# Patient Record
Sex: FEMALE | Race: WHITE | Employment: OTHER | URBAN - NONMETROPOLITAN AREA
[De-identification: names, ages, dates, MRNs, and addresses within clinical notes are randomized per-mention and may not be internally consistent; named-entity substitution may affect disease eponyms.]

---

## 2019-09-22 ENCOUNTER — HOSPITAL ENCOUNTER (EMERGENCY)
Age: 84
Discharge: HOME | End: 2019-09-22
Attending: EMERGENCY MEDICINE
Payer: MEDICARE

## 2019-09-22 ENCOUNTER — APPOINTMENT (OUTPATIENT)
Dept: GENERAL RADIOLOGY | Age: 84
End: 2019-09-22
Payer: MEDICARE

## 2019-09-22 VITALS
OXYGEN SATURATION: 96 % | HEIGHT: 63 IN | BODY MASS INDEX: 19.49 KG/M2 | HEART RATE: 72 BPM | RESPIRATION RATE: 14 BRPM | WEIGHT: 110 LBS | TEMPERATURE: 98.5 F | DIASTOLIC BLOOD PRESSURE: 70 MMHG | SYSTOLIC BLOOD PRESSURE: 153 MMHG

## 2019-09-22 DIAGNOSIS — S82.002A CLOSED DISPLACED FRACTURE OF LEFT PATELLA, UNSPECIFIED FRACTURE MORPHOLOGY, INITIAL ENCOUNTER: Primary | ICD-10-CM

## 2019-09-22 PROCEDURE — L1830 KO IMMOB CANVAS LONG PRE OTS: HCPCS

## 2019-09-22 PROCEDURE — 73564 X-RAY EXAM KNEE 4 OR MORE: CPT

## 2019-09-22 PROCEDURE — 2709999900 HC NON-CHARGEABLE SUPPLY

## 2019-09-22 PROCEDURE — 99284 EMERGENCY DEPT VISIT MOD MDM: CPT

## 2019-09-22 RX ORDER — HYDROCODONE BITARTRATE AND ACETAMINOPHEN 5; 325 MG/1; MG/1
1 TABLET ORAL EVERY 6 HOURS PRN
Qty: 8 TABLET | Refills: 0 | Status: SHIPPED | OUTPATIENT
Start: 2019-09-22 | End: 2019-09-24

## 2019-09-22 RX ORDER — METOPROLOL SUCCINATE 25 MG/1
25 TABLET, EXTENDED RELEASE ORAL DAILY
COMMUNITY

## 2019-09-22 SDOH — HEALTH STABILITY: MENTAL HEALTH: HOW OFTEN DO YOU HAVE A DRINK CONTAINING ALCOHOL?: NEVER

## 2019-09-22 ASSESSMENT — PAIN DESCRIPTION - LOCATION
LOCATION: KNEE
LOCATION: KNEE

## 2019-09-22 ASSESSMENT — ENCOUNTER SYMPTOMS
GASTROINTESTINAL NEGATIVE: 1
BACK PAIN: 0
RESPIRATORY NEGATIVE: 1

## 2019-09-22 ASSESSMENT — PAIN DESCRIPTION - ORIENTATION
ORIENTATION: LEFT
ORIENTATION: LEFT

## 2019-09-22 NOTE — ED NOTES
Left knee abrasion cleansed w/ soap and water. Telfa applied and knee wrapped w/ coban.      Susu Hanks RN  09/22/19 1824

## 2020-09-17 ENCOUNTER — HOSPITAL ENCOUNTER (OUTPATIENT)
Age: 85
Discharge: HOME | End: 2020-09-17
Payer: MEDICARE

## 2020-09-17 PROCEDURE — U0003 INFECTIOUS AGENT DETECTION BY NUCLEIC ACID (DNA OR RNA); SEVERE ACUTE RESPIRATORY SYNDROME CORONAVIRUS 2 (SARS-COV-2) (CORONAVIRUS DISEASE [COVID-19]), AMPLIFIED PROBE TECHNIQUE, MAKING USE OF HIGH THROUGHPUT TECHNOLOGIES AS DESCRIBED BY CMS-2020-01-R: HCPCS

## 2020-09-19 LAB — SARS-COV-2: DETECTED

## 2023-08-24 PROBLEM — C44.91 BASAL CELL CARCINOMA: Status: ACTIVE | Noted: 2023-08-24

## 2023-08-24 PROBLEM — I10 PRIMARY HYPERTENSION: Status: ACTIVE | Noted: 2023-08-24

## 2023-08-24 PROBLEM — S72.002D AFTERCARE FOR HEALING TRAUMATIC CLOSED FRACTURE OF LEFT HIP: Status: ACTIVE | Noted: 2023-08-24

## 2023-08-24 PROBLEM — M80.00XD AGE-RELATED OSTEOPOROSIS WITH CURRENT PATHOLOGICAL FRACTURE WITH ROUTINE HEALING: Status: ACTIVE | Noted: 2023-08-24

## 2023-08-24 PROBLEM — M40.04 POSTURAL KYPHOSIS OF THORACIC REGION: Status: ACTIVE | Noted: 2023-08-24

## 2023-08-24 PROBLEM — S72.001D AFTERCARE FOR HEALING TRAUMATIC CLOSED FRACTURE OF RIGHT HIP: Status: ACTIVE | Noted: 2023-08-24

## 2023-08-24 PROBLEM — Z85.3 HX: BREAST CANCER: Status: ACTIVE | Noted: 2023-08-24

## 2023-08-24 PROBLEM — M41.34 THORACOGENIC SCOLIOSIS OF THORACIC REGION: Status: ACTIVE | Noted: 2023-08-24

## 2023-08-24 PROBLEM — R39.15 URINARY URGENCY: Status: ACTIVE | Noted: 2023-08-24

## 2023-09-07 ENCOUNTER — OUTSIDE SERVICES (OUTPATIENT)
Dept: FAMILY MEDICINE CLINIC | Age: 88
End: 2023-09-07

## 2023-09-07 VITALS
HEART RATE: 62 BPM | RESPIRATION RATE: 18 BRPM | OXYGEN SATURATION: 95 % | TEMPERATURE: 97.6 F | WEIGHT: 98.8 LBS | SYSTOLIC BLOOD PRESSURE: 122 MMHG | BODY MASS INDEX: 17.5 KG/M2 | DIASTOLIC BLOOD PRESSURE: 64 MMHG

## 2023-09-07 DIAGNOSIS — M40.04 POSTURAL KYPHOSIS OF THORACIC REGION: ICD-10-CM

## 2023-09-07 DIAGNOSIS — I10 PRIMARY HYPERTENSION: Primary | ICD-10-CM

## 2023-09-07 DIAGNOSIS — G30.1 MODERATE LATE ONSET ALZHEIMER'S DEMENTIA WITH OTHER BEHAVIORAL DISTURBANCE (HCC): ICD-10-CM

## 2023-09-07 DIAGNOSIS — M41.34 THORACOGENIC SCOLIOSIS OF THORACIC REGION: ICD-10-CM

## 2023-09-07 DIAGNOSIS — J44.9 END STAGE COPD (HCC): ICD-10-CM

## 2023-09-07 DIAGNOSIS — M80.00XD AGE-RELATED OSTEOPOROSIS WITH CURRENT PATHOLOGICAL FRACTURE WITH ROUTINE HEALING: ICD-10-CM

## 2023-09-07 DIAGNOSIS — F02.B18 MODERATE LATE ONSET ALZHEIMER'S DEMENTIA WITH OTHER BEHAVIORAL DISTURBANCE (HCC): ICD-10-CM

## 2023-09-07 DIAGNOSIS — C44.91 BASAL CELL CARCINOMA (BCC), UNSPECIFIED SITE: ICD-10-CM

## 2023-09-07 DIAGNOSIS — Z85.3 HX: BREAST CANCER: ICD-10-CM

## 2023-09-07 DIAGNOSIS — S72.002D AFTERCARE FOR HEALING TRAUMATIC CLOSED FRACTURE OF LEFT HIP: ICD-10-CM

## 2023-09-07 PROCEDURE — 99306 1ST NF CARE HIGH MDM 50: CPT | Performed by: FAMILY MEDICINE

## 2023-09-07 PROCEDURE — 99490 CHRNC CARE MGMT STAFF 1ST 20: CPT | Performed by: FAMILY MEDICINE

## 2023-09-07 RX ORDER — TRAMADOL HYDROCHLORIDE 50 MG/1
50-100 TABLET ORAL EVERY 6 HOURS PRN
Qty: 40 TABLET | Refills: 0 | Status: SHIPPED | OUTPATIENT
Start: 2023-09-07 | End: 2023-09-14

## 2023-09-07 RX ORDER — ALPRAZOLAM 0.25 MG/1
0.25 TABLET ORAL 3 TIMES DAILY PRN
Qty: 40 TABLET | Refills: 5 | Status: SHIPPED | OUTPATIENT
Start: 2023-09-07 | End: 2023-10-07

## 2023-09-07 NOTE — PROGRESS NOTES
Kourtney Munoz is a 82 y.o. female who presents today for her medical conditions/complaints as noted below. Chief Complaint   Patient presents with    Other     Admit to SNF           HPI:     Jennifer Clements is seen as an admission from 8/23/23 from [facility]. She was admitted there on 8/20 s/p fall at home with left hip fracture and repair on 8/20/23 per Dr. Harrison Castro. This was witness per the daughter who is a nurse. The patient became dizzy when getting up from couch and then fell down. She denied hitting head or LOC.  and daughter present during visit. Jennifer Clements is seen while sitting in her recliner in her room. She has a PMX of HTN, osteoporosis, Kyphosis and scoliosis of thoracic spine, hx left breast cancer with mastectomy, urinary urgency, Hx basal cell cancer, Possible compression fx of spine from Kyphosis, among others. Per family, since the beginning of August, she has become weaker with increasing memory issues. Reviewed the note from [facility] and Daughter states that she fell years ago due to dizziness and broke her arm. She had a cardiac workup in the past that revealed PVC per the daughter. There is minimal paperwork available, therefore most of the information is from the family. Will obtain notes from Dr Kayli Velazco and Dr Chandan López - PCP. Jennifer Clements denies pain, right hip covered with dressing, no drainage or redness noted. She is currently oriented to self only. She is unable to state her date of birth, date or time, or even her last name. Daughter also states that an ECHO was done at [facility], but she is unsure of the results. Jennifer Clements denies pain or shortness of breath and is currently on O2 at 2 LPM. She did not have O2 prior to surgery. But daughter states that she has been short of breath for a while that  is due to her kyphosis and therefore resulting in restrictive lung disease. Her goal is to recover and return home with her .         After a long discussion today with the patient, the

## 2023-09-18 PROBLEM — I50.84 END STAGE CONGESTIVE HEART FAILURE (HCC): Status: ACTIVE | Noted: 2023-09-18

## 2023-09-22 ENCOUNTER — OUTSIDE SERVICES (OUTPATIENT)
Dept: FAMILY MEDICINE CLINIC | Age: 88
End: 2023-09-22

## 2023-09-22 VITALS
HEART RATE: 62 BPM | BODY MASS INDEX: 17.77 KG/M2 | RESPIRATION RATE: 20 BRPM | TEMPERATURE: 97.6 F | SYSTOLIC BLOOD PRESSURE: 126 MMHG | OXYGEN SATURATION: 95 % | WEIGHT: 100.3 LBS | DIASTOLIC BLOOD PRESSURE: 62 MMHG

## 2023-09-22 DIAGNOSIS — F02.B18 MODERATE LATE ONSET ALZHEIMER'S DEMENTIA WITH OTHER BEHAVIORAL DISTURBANCE (HCC): ICD-10-CM

## 2023-09-22 DIAGNOSIS — Z85.3 HX: BREAST CANCER: ICD-10-CM

## 2023-09-22 DIAGNOSIS — M80.00XD AGE-RELATED OSTEOPOROSIS WITH CURRENT PATHOLOGICAL FRACTURE WITH ROUTINE HEALING: ICD-10-CM

## 2023-09-22 DIAGNOSIS — J44.9 END STAGE COPD (HCC): ICD-10-CM

## 2023-09-22 DIAGNOSIS — M41.34 THORACOGENIC SCOLIOSIS OF THORACIC REGION: ICD-10-CM

## 2023-09-22 DIAGNOSIS — J18.9 PNEUMONIA DUE TO INFECTIOUS ORGANISM, UNSPECIFIED LATERALITY, UNSPECIFIED PART OF LUNG: Primary | ICD-10-CM

## 2023-09-22 DIAGNOSIS — M40.04 POSTURAL KYPHOSIS OF THORACIC REGION: ICD-10-CM

## 2023-09-22 DIAGNOSIS — C44.91 BASAL CELL CARCINOMA (BCC), UNSPECIFIED SITE: ICD-10-CM

## 2023-09-22 DIAGNOSIS — I10 PRIMARY HYPERTENSION: ICD-10-CM

## 2023-09-22 DIAGNOSIS — G30.1 MODERATE LATE ONSET ALZHEIMER'S DEMENTIA WITH OTHER BEHAVIORAL DISTURBANCE (HCC): ICD-10-CM

## 2023-09-22 DIAGNOSIS — S72.002D AFTERCARE FOR HEALING TRAUMATIC CLOSED FRACTURE OF LEFT HIP: ICD-10-CM

## 2023-09-22 NOTE — PROGRESS NOTES
Aftercare for healing traumatic closed fracture of left hip   Continue baclofen 2.5 mg, Ibuprofen 400 mg, Tylenol   Continue therapies as able - she has been refusing  Monitor incision for s/s infection  Encourage Incentive spirometry q2 hr while awake  OK to wean O2 to maintain SpO2 >92%  Fall and safety precautions  F/u Ortho    2. Primary hypertension   Controlled - Continue to monitor  Continue metoprolol succinate 25 mg   3. Postural kyphosis of thoracic region   Compression fracture(s) found during hospitalization - she has had more pain recently prior to fall  Pain control as above   4. Age-related osteoporosis with current pathological fracture with routine healing   Takes Prolia  Continue supplements   5. Thoracogenic scoliosis of thoracic region - hx of - as above   6. Urinary urgency - hx of   7. Basal cell carcinoma (BCC), unspecified site - hx of   8. HX: breast cancer - hx of  S/p mastectomy  Continue anastrozole 1 mg   9. Confusion vs Alzheimer dementia without behaviors   Fall and safety precautions   Reorient and redirect as able   Continue hydroxyzine pamoate 25 mg              Increase aricept to 10 mg   Continue to monitor  10. Decline in condition  11. Pneumonia Recheck CXR in 1 week     This is a re admission, therefore extended time was taken in chart and medication review. Patient seen and examined. History partially obtained by chart review and nursing notes. I have reviewed patient's past medical, surgical, social, and family history and have made updates where appropriate. See facility EMR for updated medication list.      Total time spent on date of service was 55 minutes.   Time spent includes some or all of the following, both face-to-face time and non face-to-face, but is not limited to: reviewing records or discussing history or pan with colleagues, obtaining and/or reviewing the history, performing a medically appropriate examination/evaluation, counseling patient

## 2023-10-23 PROBLEM — F02.B4 MODERATE LATE ONSET ALZHEIMER'S DEMENTIA WITH ANXIETY (HCC): Status: ACTIVE | Noted: 2023-10-23

## 2023-10-23 PROBLEM — G30.1 MODERATE LATE ONSET ALZHEIMER'S DEMENTIA WITH ANXIETY (HCC): Status: ACTIVE | Noted: 2023-10-23

## 2023-11-02 ENCOUNTER — OUTSIDE SERVICES (OUTPATIENT)
Dept: FAMILY MEDICINE CLINIC | Age: 88
End: 2023-11-02

## 2023-11-02 DIAGNOSIS — M40.04 POSTURAL KYPHOSIS OF THORACIC REGION: ICD-10-CM

## 2023-11-02 DIAGNOSIS — I10 PRIMARY HYPERTENSION: ICD-10-CM

## 2023-11-02 DIAGNOSIS — C44.91 BASAL CELL CARCINOMA (BCC), UNSPECIFIED SITE: ICD-10-CM

## 2023-11-02 DIAGNOSIS — G30.1 MODERATE LATE ONSET ALZHEIMER'S DEMENTIA WITH ANXIETY (HCC): ICD-10-CM

## 2023-11-02 DIAGNOSIS — M41.34 THORACOGENIC SCOLIOSIS OF THORACIC REGION: ICD-10-CM

## 2023-11-02 DIAGNOSIS — I50.84 END STAGE CONGESTIVE HEART FAILURE (HCC): ICD-10-CM

## 2023-11-02 DIAGNOSIS — F02.B4 MODERATE LATE ONSET ALZHEIMER'S DEMENTIA WITH ANXIETY (HCC): ICD-10-CM

## 2023-11-02 DIAGNOSIS — J44.9 END STAGE COPD (HCC): Primary | ICD-10-CM

## 2023-11-02 DIAGNOSIS — M80.00XD AGE-RELATED OSTEOPOROSIS WITH CURRENT PATHOLOGICAL FRACTURE WITH ROUTINE HEALING: ICD-10-CM

## 2023-11-02 DIAGNOSIS — Z85.3 HX: BREAST CANCER: ICD-10-CM

## 2023-11-03 VITALS
DIASTOLIC BLOOD PRESSURE: 65 MMHG | HEART RATE: 64 BPM | RESPIRATION RATE: 18 BRPM | WEIGHT: 100.8 LBS | TEMPERATURE: 97.6 F | OXYGEN SATURATION: 97 % | BODY MASS INDEX: 17.86 KG/M2 | SYSTOLIC BLOOD PRESSURE: 128 MMHG

## 2023-11-03 ASSESSMENT — ENCOUNTER SYMPTOMS
SHORTNESS OF BREATH: 1
COUGH: 0
CHEST TIGHTNESS: 0

## 2024-01-04 ENCOUNTER — OUTSIDE SERVICES (OUTPATIENT)
Dept: FAMILY MEDICINE CLINIC | Age: 89
End: 2024-01-04

## 2024-01-04 VITALS — OXYGEN SATURATION: 98 % | HEART RATE: 69 BPM | BODY MASS INDEX: 17.27 KG/M2 | WEIGHT: 97.5 LBS

## 2024-01-04 DIAGNOSIS — C44.91 BASAL CELL CARCINOMA (BCC), UNSPECIFIED SITE: ICD-10-CM

## 2024-01-04 DIAGNOSIS — R39.15 URINARY URGENCY: ICD-10-CM

## 2024-01-04 DIAGNOSIS — M80.00XD AGE-RELATED OSTEOPOROSIS WITH CURRENT PATHOLOGICAL FRACTURE WITH ROUTINE HEALING: ICD-10-CM

## 2024-01-04 DIAGNOSIS — J44.9 END STAGE COPD (HCC): Primary | ICD-10-CM

## 2024-01-04 DIAGNOSIS — I50.84 END STAGE CONGESTIVE HEART FAILURE (HCC): ICD-10-CM

## 2024-01-04 DIAGNOSIS — F02.B4 MODERATE LATE ONSET ALZHEIMER'S DEMENTIA WITH ANXIETY (HCC): ICD-10-CM

## 2024-01-04 DIAGNOSIS — S72.002D AFTERCARE FOR HEALING TRAUMATIC CLOSED FRACTURE OF LEFT HIP: ICD-10-CM

## 2024-01-04 DIAGNOSIS — M40.04 POSTURAL KYPHOSIS OF THORACIC REGION: ICD-10-CM

## 2024-01-04 DIAGNOSIS — M41.34 THORACOGENIC SCOLIOSIS OF THORACIC REGION: ICD-10-CM

## 2024-01-04 DIAGNOSIS — I10 PRIMARY HYPERTENSION: ICD-10-CM

## 2024-01-04 DIAGNOSIS — G30.1 MODERATE LATE ONSET ALZHEIMER'S DEMENTIA WITH ANXIETY (HCC): ICD-10-CM

## 2024-01-04 DIAGNOSIS — Z85.3 HX: BREAST CANCER: ICD-10-CM

## 2024-01-04 PROCEDURE — 99308 SBSQ NF CARE LOW MDM 20: CPT | Performed by: FAMILY MEDICINE

## 2024-01-04 PROCEDURE — 99490 CHRNC CARE MGMT STAFF 1ST 20: CPT | Performed by: FAMILY MEDICINE

## 2024-01-04 ASSESSMENT — ENCOUNTER SYMPTOMS
CHEST TIGHTNESS: 0
COUGH: 0
SHORTNESS OF BREATH: 1

## 2024-01-04 NOTE — PROGRESS NOTES
breath (at baseline). Negative for cough and chest tightness.    Cardiovascular:  Negative for chest pain and leg swelling.       Objective:     Physical Exam  Vitals and nursing note reviewed.   Constitutional:       General: She is not in acute distress.     Appearance: She is normal weight. She is ill-appearing. She is not diaphoretic.      Comments: Frail, cachetic ,weak   HENT:      Head: Normocephalic and atraumatic.      Right Ear: External ear normal.      Left Ear: External ear normal.      Nose: Nose normal. No congestion or rhinorrhea.      Mouth/Throat:      Mouth: Mucous membranes are moist.      Pharynx: No oropharyngeal exudate.   Eyes:      General: No scleral icterus.        Right eye: No discharge.         Left eye: No discharge.      Extraocular Movements: Extraocular movements intact.      Conjunctiva/sclera: Conjunctivae normal.   Cardiovascular:      Rate and Rhythm: Normal rate. Rhythm irregular.      Pulses: Normal pulses.      Heart sounds: Murmur (6/6) heard.   Pulmonary:      Effort: Pulmonary effort is normal. No respiratory distress.      Breath sounds: Normal breath sounds. No wheezing, rhonchi or rales.   Abdominal:      General: Bowel sounds are normal. There is no distension.      Palpations: Abdomen is soft.      Tenderness: There is no abdominal tenderness. There is no guarding.   Musculoskeletal:         General: No swelling or tenderness.      Cervical back: Normal range of motion and neck supple. No rigidity or tenderness.      Right lower leg: No edema.      Left lower leg: No edema.      Comments: Limited ROM due to weakness   Skin:     General: Skin is warm and dry.      Coloration: Skin is not jaundiced or pale.   Neurological:      Mental Status: She is alert. She is disoriented.       Pulse 69   Wt 44.2 kg (97 lb 8 oz)   SpO2 98%   BMI 17.27 kg/m²     Wt Readings from Last 3 Encounters:   01/04/24 44.2 kg (97 lb 8 oz)   12/07/23 43.9 kg (96 lb 12.8 oz)   11/03/23 45.7

## 2024-03-05 VITALS — HEART RATE: 80 BPM | OXYGEN SATURATION: 95 %

## 2024-03-06 NOTE — PROGRESS NOTES
Daija Nolasco is a 88 y.o. female who presents today for her medical conditions/complaints as noted below.   No chief complaint on file.          HPI:     Daija os seen for her monthly chronic illness f/u. She has a PMX of left hip fx on 8/20/23, late onset severe Alzheimer's dementia, HTN, osteoporosis, Kyphosis and scoliosis of thoracic spine, hx left breast cancer with mastectomy, urinary urgency, Hx basal cell cancer, possible compression fx of spine from Kyphosis, among others. She is seen while laying in bed, she is alert, oriented to self only, able to follow commands, unable to state a full sentence.  She has no apparent pain or shortness of breath out of her ordinary.  Staff states that she lays in bed most of the time.  She refuses to be turned, she often refuses care.  She lost 7+lbs last month.  No family at bedside.         Past Medical History:   Diagnosis Date    Cancer (HCC)     breast    Hypertension     Osteoporosis       Past Surgical History:   Procedure Laterality Date    MASTECTOMY Left     WRIST SURGERY         History reviewed. No pertinent family history.    Social History     Tobacco Use    Smoking status: Never    Smokeless tobacco: Never   Substance Use Topics    Alcohol use: Never      No Known Allergies    Health Maintenance   Topic Date Due    COVID-19 Vaccine (1) Never done    Depression Screen  Never done    DTaP/Tdap/Td vaccine (1 - Tdap) Never done    Shingles vaccine (1 of 2) Never done    Respiratory Syncytial Virus (RSV) Pregnant or age 60 yrs+ (1 - 1-dose 60+ series) Never done    Pneumococcal 65+ years Vaccine (1 - PCV) Never done    Flu vaccine (1) Never done    Hepatitis A vaccine  Aged Out    Hepatitis B vaccine  Aged Out    Hib vaccine  Aged Out    Polio vaccine  Aged Out    Meningococcal (ACWY) vaccine  Aged Out       Subjective:      Review of Systems   Unable to perform ROS: Dementia (denies all)       Objective:     Physical Exam  Vitals and nursing note reviewed.

## 2024-03-07 ENCOUNTER — OUTSIDE SERVICES (OUTPATIENT)
Dept: FAMILY MEDICINE CLINIC | Age: 89
End: 2024-03-07

## 2024-03-07 DIAGNOSIS — J44.9 END STAGE COPD (HCC): ICD-10-CM

## 2024-03-07 DIAGNOSIS — S72.002D AFTERCARE FOR HEALING TRAUMATIC CLOSED FRACTURE OF LEFT HIP: ICD-10-CM

## 2024-03-07 DIAGNOSIS — M80.00XD AGE-RELATED OSTEOPOROSIS WITH CURRENT PATHOLOGICAL FRACTURE WITH ROUTINE HEALING: ICD-10-CM

## 2024-03-07 DIAGNOSIS — I10 PRIMARY HYPERTENSION: ICD-10-CM

## 2024-03-07 DIAGNOSIS — M40.04 POSTURAL KYPHOSIS OF THORACIC REGION: ICD-10-CM

## 2024-03-07 DIAGNOSIS — M41.34 THORACOGENIC SCOLIOSIS OF THORACIC REGION: ICD-10-CM

## 2024-03-07 DIAGNOSIS — Z85.3 HX: BREAST CANCER: ICD-10-CM

## 2024-03-07 DIAGNOSIS — G30.1 MODERATE LATE ONSET ALZHEIMER'S DEMENTIA WITH ANXIETY (HCC): Primary | ICD-10-CM

## 2024-03-07 DIAGNOSIS — I50.84 END STAGE CONGESTIVE HEART FAILURE (HCC): ICD-10-CM

## 2024-03-07 DIAGNOSIS — R39.15 URINARY URGENCY: ICD-10-CM

## 2024-03-07 DIAGNOSIS — C44.91 BASAL CELL CARCINOMA (BCC), UNSPECIFIED SITE: ICD-10-CM

## 2024-03-07 DIAGNOSIS — F02.B4 MODERATE LATE ONSET ALZHEIMER'S DEMENTIA WITH ANXIETY (HCC): Primary | ICD-10-CM

## 2024-03-07 PROCEDURE — 99490 CHRNC CARE MGMT STAFF 1ST 20: CPT | Performed by: FAMILY MEDICINE

## 2024-03-07 PROCEDURE — 99309 SBSQ NF CARE MODERATE MDM 30: CPT | Performed by: FAMILY MEDICINE

## 2024-05-01 VITALS — WEIGHT: 90 LBS | OXYGEN SATURATION: 96 % | BODY MASS INDEX: 15.94 KG/M2

## 2024-05-01 NOTE — PROGRESS NOTES
[facility] Monthly Progress Note  1 month routine follow up of chronic illnesses.    NAME: Daija Nolasco  DATE: 24  : 1935  Code Status:DNRCCA, palliative care  Date of admission:    History obtained from chart review, staff and the patient.    SUBJECTIVE:  HPI:   Daija Nolasco is a 88 y.o. female. Pt seen and examined at bedside. She is being seen for monthly follow up of chronic conditions; Alzheimer dementia,  end stage COPD, CHF, BCC, hx breast cancer, HTN, kyphosis, osteoporosis, thoracogenic scoliosis of thoracic region, urinary urgency, hx left hip fracture.     She is on 2 lpm chronically, cannot speak in full sentences due to SOB.  Alert to self only, can follow simple commands. Does not appear in acute distress. Mostly in bed due to DANGELO.  Frequently refuses to be repositioned, and often refuses personal care.  Has lost 6 lb since first of year.  Unchanged from last month.  No acute complaints today. Current with palliative care.      Allergies and Medications were reviewed through the [facility] EMR. All medications reviewed and reconciled, including OTC and herbal medications.       I have reviewed the patient's past medical history, past surgical history, allergies,medications, social and family history and I have made updates where appropriate.    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

## 2024-05-02 ENCOUNTER — OUTSIDE SERVICES (OUTPATIENT)
Dept: FAMILY MEDICINE CLINIC | Age: 89
End: 2024-05-02

## 2024-05-02 DIAGNOSIS — Z85.3 HX: BREAST CANCER: ICD-10-CM

## 2024-05-02 DIAGNOSIS — R39.15 URINARY URGENCY: ICD-10-CM

## 2024-05-02 DIAGNOSIS — G30.1 MODERATE LATE ONSET ALZHEIMER'S DEMENTIA WITH ANXIETY (HCC): ICD-10-CM

## 2024-05-02 DIAGNOSIS — F02.B4 MODERATE LATE ONSET ALZHEIMER'S DEMENTIA WITH ANXIETY (HCC): ICD-10-CM

## 2024-05-02 DIAGNOSIS — I50.84 END STAGE CONGESTIVE HEART FAILURE (HCC): ICD-10-CM

## 2024-05-02 DIAGNOSIS — J44.9 END STAGE COPD (HCC): Primary | ICD-10-CM

## 2024-05-02 DIAGNOSIS — M40.04 POSTURAL KYPHOSIS OF THORACIC REGION: ICD-10-CM

## 2024-05-02 DIAGNOSIS — M41.34 THORACOGENIC SCOLIOSIS OF THORACIC REGION: ICD-10-CM

## 2024-05-02 DIAGNOSIS — M80.00XD AGE-RELATED OSTEOPOROSIS WITH CURRENT PATHOLOGICAL FRACTURE WITH ROUTINE HEALING: ICD-10-CM

## 2024-05-02 DIAGNOSIS — C44.91 BASAL CELL CARCINOMA (BCC), UNSPECIFIED SITE: ICD-10-CM

## 2024-05-02 DIAGNOSIS — I10 PRIMARY HYPERTENSION: ICD-10-CM

## 2024-07-16 VITALS
DIASTOLIC BLOOD PRESSURE: 67 MMHG | HEART RATE: 65 BPM | RESPIRATION RATE: 18 BRPM | WEIGHT: 87 LBS | OXYGEN SATURATION: 98 % | TEMPERATURE: 98 F | BODY MASS INDEX: 15.41 KG/M2 | SYSTOLIC BLOOD PRESSURE: 113 MMHG

## 2024-07-17 NOTE — PROGRESS NOTES
[facility] Monthly Progress Note  1 month routine follow up of chronic illnesses.    NAME: Daija Nolasco  DATE: 24  : 1935  Code Status:DNCA  Date of admission:    History obtained from chart review, staff and the patient.    SUBJECTIVE:  HPI:   Daija Nolasco is a 88 y.o. female. Pt seen and examined at bedside. She is being seen for monthly follow up of chronic conditions; Alzheimer dementia,  end stage COPD, CHF, BCC, hx breast cancer, HTN, kyphosis, osteoporosis, thoracogenic scoliosis of thoracic region, urinary urgency, hx left hip fracture.     She is on 2 lpm chronically, cannot speak in full sentences due to SOB.  Alert to self only, can follow simple commands. Does not appear in acute distress. Mostly in bed due to DANGELO.  Frequently refuses to be repositioned, and often refuses personal care.  Has lost another 3 lb since this past month.    No acute complaints today. Current with palliative care  Her  who is at bedside reports that she has been a lot more tired in the past 7 to 10 days.  Orders will be placed for urine analysis and culture as well as CBC and CMP.  If these all come back normal, hospice has been offered but her  is not quite ready to start that yet.  We will wait and readdress this when the lab results are back.    Allergies and Medications were reviewed through the [facility] EMR. All medications reviewed and reconciled, including OTC and herbal medications.       I have reviewed the patient's past medical history, past surgical history, allergies,medications, social and family history and I have made updates where appropriate.    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

## 2024-07-18 ENCOUNTER — OUTSIDE SERVICES (OUTPATIENT)
Dept: FAMILY MEDICINE CLINIC | Age: 89
End: 2024-07-18

## 2024-07-18 DIAGNOSIS — M41.34 THORACOGENIC SCOLIOSIS OF THORACIC REGION: ICD-10-CM

## 2024-07-18 DIAGNOSIS — I50.84 END STAGE CONGESTIVE HEART FAILURE (HCC): ICD-10-CM

## 2024-07-18 DIAGNOSIS — I10 PRIMARY HYPERTENSION: ICD-10-CM

## 2024-07-18 DIAGNOSIS — Z85.3 HX: BREAST CANCER: ICD-10-CM

## 2024-07-18 DIAGNOSIS — G30.1 MODERATE LATE ONSET ALZHEIMER'S DEMENTIA WITH ANXIETY (HCC): ICD-10-CM

## 2024-07-18 DIAGNOSIS — M40.04 POSTURAL KYPHOSIS OF THORACIC REGION: ICD-10-CM

## 2024-07-18 DIAGNOSIS — F02.B4 MODERATE LATE ONSET ALZHEIMER'S DEMENTIA WITH ANXIETY (HCC): ICD-10-CM

## 2024-07-18 DIAGNOSIS — M80.00XD AGE-RELATED OSTEOPOROSIS WITH CURRENT PATHOLOGICAL FRACTURE WITH ROUTINE HEALING: ICD-10-CM

## 2024-07-18 DIAGNOSIS — J44.9 END STAGE COPD (HCC): Primary | ICD-10-CM

## 2024-07-18 DIAGNOSIS — S72.002D AFTERCARE FOR HEALING TRAUMATIC CLOSED FRACTURE OF LEFT HIP: ICD-10-CM

## 2024-07-18 DIAGNOSIS — C44.91 BASAL CELL CARCINOMA (BCC), UNSPECIFIED SITE: ICD-10-CM

## 2024-07-18 DIAGNOSIS — F41.9 ANXIETY: ICD-10-CM

## 2024-07-18 PROCEDURE — 99309 SBSQ NF CARE MODERATE MDM 30: CPT | Performed by: FAMILY MEDICINE

## 2024-09-03 VITALS
BODY MASS INDEX: 14.84 KG/M2 | TEMPERATURE: 97.8 F | WEIGHT: 83.8 LBS | SYSTOLIC BLOOD PRESSURE: 138 MMHG | DIASTOLIC BLOOD PRESSURE: 87 MMHG | OXYGEN SATURATION: 96 % | RESPIRATION RATE: 18 BRPM | HEART RATE: 81 BPM

## 2024-09-04 NOTE — PROGRESS NOTES
[facility] Monthly Progress Note  1 month routine follow up of chronic illnesses.    NAME: Daija Nolasco  DATE: 24  : 1935  Code Status:DNCA  Date of admission:    History obtained from chart review, staff and the patient.    SUBJECTIVE:  HPI:   Daija Nolasco is a 88 y.o. female. Pt seen and examined at bedside. She is being seen for monthly follow up of chronic conditions; Alzheimer dementia,  end stage COPD, CHF, BCC, hx breast cancer, HTN, kyphosis, osteoporosis, thoracogenic scoliosis of thoracic region, urinary urgency, hx left hip fracture.     She is on 2 lpm chronically, cannot speak in full sentences due to SOB.  Alert to self only, can follow simple commands. Does not appear in acute distress. Mostly in bed due to DANGELO.  Frequently refuses to be repositioned, and often refuses personal care.  Has lost 7 lb since first of year.      Had some recent anemia, workup note with positive FOBT.   was approached about possible GI workup, including CT scan and/or EGD.  Unsure pt would tolerate that. Family still has not decided.  Hgb has remained stable.     No acute complaints today. Current with palliative care.      Allergies and Medications were reviewed through the [facility] EMR. All medications reviewed and reconciled, including OTC and herbal medications.     I have reviewed the patient's past medical history, past surgical history, allergies,medications, social and family history and I have made updates where appropriate.    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

## 2024-09-05 ENCOUNTER — OUTSIDE SERVICES (OUTPATIENT)
Dept: FAMILY MEDICINE CLINIC | Age: 89
End: 2024-09-05

## 2024-09-05 DIAGNOSIS — M80.00XD AGE-RELATED OSTEOPOROSIS WITH CURRENT PATHOLOGICAL FRACTURE WITH ROUTINE HEALING: ICD-10-CM

## 2024-09-05 DIAGNOSIS — F02.B4 MODERATE LATE ONSET ALZHEIMER'S DEMENTIA WITH ANXIETY (HCC): ICD-10-CM

## 2024-09-05 DIAGNOSIS — S72.002D AFTERCARE FOR HEALING TRAUMATIC CLOSED FRACTURE OF LEFT HIP: ICD-10-CM

## 2024-09-05 DIAGNOSIS — G30.1 MODERATE LATE ONSET ALZHEIMER'S DEMENTIA WITH ANXIETY (HCC): ICD-10-CM

## 2024-09-05 DIAGNOSIS — M40.04 POSTURAL KYPHOSIS OF THORACIC REGION: ICD-10-CM

## 2024-09-05 DIAGNOSIS — J44.9 END STAGE COPD (HCC): Primary | ICD-10-CM

## 2024-09-05 DIAGNOSIS — F41.9 ANXIETY: ICD-10-CM

## 2024-09-05 DIAGNOSIS — I10 PRIMARY HYPERTENSION: ICD-10-CM

## 2024-09-05 DIAGNOSIS — M41.34 THORACOGENIC SCOLIOSIS OF THORACIC REGION: ICD-10-CM

## 2024-09-05 DIAGNOSIS — I50.84 END STAGE CONGESTIVE HEART FAILURE (HCC): ICD-10-CM

## 2024-09-05 DIAGNOSIS — D64.9 ANEMIA, UNSPECIFIED TYPE: ICD-10-CM

## 2024-09-05 DIAGNOSIS — Z85.3 HX: BREAST CANCER: ICD-10-CM

## 2024-09-05 DIAGNOSIS — C44.91 BASAL CELL CARCINOMA (BCC), UNSPECIFIED SITE: ICD-10-CM

## 2024-09-05 DIAGNOSIS — R19.5 FECAL OCCULT BLOOD TEST POSITIVE: ICD-10-CM

## 2024-09-05 DIAGNOSIS — R39.15 URINARY URGENCY: ICD-10-CM

## 2024-11-05 VITALS
SYSTOLIC BLOOD PRESSURE: 175 MMHG | HEART RATE: 66 BPM | RESPIRATION RATE: 18 BRPM | DIASTOLIC BLOOD PRESSURE: 79 MMHG | TEMPERATURE: 98.2 F | OXYGEN SATURATION: 96 % | WEIGHT: 85.4 LBS | BODY MASS INDEX: 15.13 KG/M2

## 2024-11-05 NOTE — PROGRESS NOTES
[facility] Monthly Progress Note  1 month routine follow up of chronic illnesses.    NAME: Daija Nolasco  DATE: 10/17/24  : 1935  Code Status:DNCA  Date of admission:    History obtained from chart review, staff and the patient.    SUBJECTIVE:  HPI:   Daija Nolasco is a 88 y.o. female. Pt seen and examined at bedside. She is being seen for monthly follow up of chronic conditions; Alzheimer dementia,  end stage COPD, CHF, BCC, hx breast cancer, HTN, kyphosis, osteoporosis, thoracogenic scoliosis of thoracic region, urinary urgency, hx left hip fracture.     She is on 2 lpm O2 chronically, cannot speak in full sentences due to SOB.  Alert to self only, can follow simple commands. Does not appear in acute distress. Mostly in bed due to DANGELO.  Frequently refuses to be repositioned, and often refuses personal care.  Con't with slow weight loss.     Had some recent anemia, workup note with positive FOBT.   was approached about possible GI workup, including CT scan and/or EGD.  /family declined further workup, as patient likely would not tolerate.   has been agreeable to follow labs for stability hgb has be stable. Hemoglobin has again dropped.  Hemoccult stool is again positive.  This is discussed with her  who is at bedside.  We discussed that she will need a GI referral and will likely need a colonoscopy to find the source of bleeding.  He will be discussing this with his daughter and get back to us on what he would like to do.Will recheck CBC and iron studies this week    Sodium was elevated and will be rechecked with labs this week as well     reports that memory seems to be a little better with higher doses of Aricept and Namenda    No acute complaints today. Current with palliative care.      Allergies and Medications were reviewed through the [facility] EMR. All medications reviewed and reconciled, including OTC and herbal medications.     I have reviewed the

## 2024-11-07 ENCOUNTER — OUTSIDE SERVICES (OUTPATIENT)
Dept: FAMILY MEDICINE CLINIC | Age: 89
End: 2024-11-07

## 2024-11-07 DIAGNOSIS — J44.9 END STAGE COPD (HCC): Primary | ICD-10-CM

## 2024-11-07 DIAGNOSIS — I10 PRIMARY HYPERTENSION: ICD-10-CM

## 2024-11-07 DIAGNOSIS — F02.B4 MODERATE LATE ONSET ALZHEIMER'S DEMENTIA WITH ANXIETY (HCC): ICD-10-CM

## 2024-11-07 DIAGNOSIS — M40.04 POSTURAL KYPHOSIS OF THORACIC REGION: ICD-10-CM

## 2024-11-07 DIAGNOSIS — M80.00XD AGE-RELATED OSTEOPOROSIS WITH CURRENT PATHOLOGICAL FRACTURE WITH ROUTINE HEALING: ICD-10-CM

## 2024-11-07 DIAGNOSIS — F41.9 ANXIETY: ICD-10-CM

## 2024-11-07 DIAGNOSIS — G30.1 MODERATE LATE ONSET ALZHEIMER'S DEMENTIA WITH ANXIETY (HCC): ICD-10-CM

## 2024-11-07 DIAGNOSIS — I50.84 END STAGE CONGESTIVE HEART FAILURE (HCC): ICD-10-CM

## 2024-11-07 DIAGNOSIS — D64.9 ANEMIA, UNSPECIFIED TYPE: ICD-10-CM

## 2024-11-07 DIAGNOSIS — R19.5 FECAL OCCULT BLOOD TEST POSITIVE: ICD-10-CM

## 2024-11-07 DIAGNOSIS — M41.34 THORACOGENIC SCOLIOSIS OF THORACIC REGION: ICD-10-CM

## 2024-11-07 DIAGNOSIS — C44.91 BASAL CELL CARCINOMA (BCC), UNSPECIFIED SITE: ICD-10-CM

## 2024-11-07 DIAGNOSIS — Z85.3 HX: BREAST CANCER: ICD-10-CM
